# Patient Record
Sex: MALE | Race: WHITE | NOT HISPANIC OR LATINO | Employment: FULL TIME | URBAN - METROPOLITAN AREA
[De-identification: names, ages, dates, MRNs, and addresses within clinical notes are randomized per-mention and may not be internally consistent; named-entity substitution may affect disease eponyms.]

---

## 2020-03-25 ENCOUNTER — OCCUPATIONAL MEDICINE (OUTPATIENT)
Dept: URGENT CARE | Facility: PHYSICIAN GROUP | Age: 35
End: 2020-03-25
Payer: COMMERCIAL

## 2020-03-25 VITALS
BODY MASS INDEX: 23.1 KG/M2 | TEMPERATURE: 98.2 F | OXYGEN SATURATION: 94 % | SYSTOLIC BLOOD PRESSURE: 114 MMHG | HEART RATE: 64 BPM | DIASTOLIC BLOOD PRESSURE: 72 MMHG | WEIGHT: 165 LBS | HEIGHT: 71 IN

## 2020-03-25 DIAGNOSIS — S93.402A MILD ANKLE SPRAIN, LEFT, INITIAL ENCOUNTER: ICD-10-CM

## 2020-03-25 PROCEDURE — 99203 OFFICE O/P NEW LOW 30 MIN: CPT | Performed by: PHYSICIAN ASSISTANT

## 2020-03-25 RX ORDER — SERTRALINE HYDROCHLORIDE 100 MG/1
TABLET, FILM COATED ORAL DAILY
COMMUNITY
Start: 2020-03-09 | End: 2021-12-28

## 2020-03-25 ASSESSMENT — ENCOUNTER SYMPTOMS
TINGLING: 0
SENSORY CHANGE: 0
FOCAL WEAKNESS: 0

## 2020-03-25 ASSESSMENT — PAIN SCALES - GENERAL: PAINLEVEL: 6=MODERATE PAIN

## 2020-03-25 NOTE — PROGRESS NOTES
"Subjective:     Malick Paul  is a 34 y.o. male who presents for Ankle Injury (left ankle inj, stepped in hole, twisted ankle DOI 3/25/2020)    DOI: 3/25/20 at approximately of 11:30 AM.  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  On the date of injury, patient made a delivery and when he turned around his left ankle fell into a hole that was covered with snow sustaining an inversion type injury to the left ankle.  Patient denies any prior injury to the ankle.  Patient is complaining of pain along the lateral aspect of the ankle worse when stepping down and plantar and dorsiflexing the foot.  Denies numbness or tingling.  Denies any pain over the proximal fibula.  Ankle Injury    Pertinent negatives include no tingling.         Review of Systems   Musculoskeletal: Positive for joint pain.   Neurological: Negative for tingling, sensory change and focal weakness.   All other systems reviewed and are negative.    No Known Allergies  Past medical history, family history, surgical history and social history are reviewed and updated in the record today.     Objective:   /72   Pulse 64   Temp 36.8 °C (98.2 °F)   Ht 1.803 m (5' 11\")   Wt 74.8 kg (165 lb)   SpO2 94%   BMI 23.01 kg/m²   Physical Exam  Vitals signs reviewed.   Constitutional:       General: He is not in acute distress.     Appearance: He is well-developed. He is not ill-appearing or toxic-appearing.   HENT:      Head: Normocephalic and atraumatic.      Jaw: There is normal jaw occlusion.      Right Ear: External ear normal.      Left Ear: External ear normal.      Nose: Nose normal.      Mouth/Throat:      Mouth: Mucous membranes are moist.   Eyes:      General: Lids are normal.      Extraocular Movements: Extraocular movements intact.      Conjunctiva/sclera: Conjunctivae normal.      Pupils: Pupils are equal, round, and reactive to light.   Neck:      Musculoskeletal: Normal range of motion and neck " supple.   Cardiovascular:      Rate and Rhythm: Normal rate and regular rhythm.      Pulses:           Dorsalis pedis pulses are 2+ on the right side and 2+ on the left side.        Posterior tibial pulses are 2+ on the right side and 2+ on the left side.      Heart sounds: Normal heart sounds.   Pulmonary:      Effort: Pulmonary effort is normal. No respiratory distress.      Breath sounds: Normal breath sounds.   Musculoskeletal: Normal range of motion.      Right ankle: Normal.      Left ankle: He exhibits normal range of motion, no swelling, no ecchymosis and no deformity. Tenderness. No lateral malleolus and no medial malleolus tenderness found. Achilles tendon normal.        Feet:    Skin:     General: Skin is warm and dry.      Findings: No rash.   Neurological:      Mental Status: He is alert and oriented to person, place, and time.      Cranial Nerves: No cranial nerve deficit.      Sensory: No sensory deficit.      Motor: Motor function is intact.      Coordination: Coordination is intact.   Psychiatric:         Attention and Perception: Attention normal.         Mood and Affect: Mood and affect normal.         Speech: Speech normal.         Behavior: Behavior normal.         Thought Content: Thought content normal.         Cognition and Memory: Cognition normal.         Judgment: Judgment normal.       Left leg: No soft tissue swelling, ecchymosis or deformity noted.  Nontender along proximal fibula.  Left ankle: No soft tissue swelling, ecchymosis or deformity noted.  Dorsalis pedis posterior tibial pulse 2+.  Sensation grossly intact.  Slight tenderness to palpation of her anterior talofibular ligament.  No bony tenderness.  Negative anterior drawer.  No pain with resisted dorsiflexion, plantar flexion, internal or external rotation.  Assessment/Plan:   1. Mild ankle sprain, left, initial encounter    Patient does not meet [place] rules for imaging.  Recommend lace up ankle brace, ice, elevation.   Patient may use over-the-counter ibuprofen as needed for pain not to exceed recommended daily dose.  Work restrictions as noted above.  Follow-up for reevaluation on 3/30/20.    [place] D-39 and C-4 completed.     Differential diagnosis, natural history, supportive care, and indications for immediate follow-up discussed.  Red flag warning symptoms and strict ER/follow-up precautions given.  The patient demonstrated a good understanding and agreed with the treatment plan.  Please note that this note was created using voice recognition speech to text software. Every effort has been made to correct obvious errors.  However, I expect there are errors of grammar and possibly context that were not discovered prior to finalizing the note  FABIANO Vences PA-C

## 2020-03-25 NOTE — LETTER
"EMPLOYEE’S CLAIM FOR COMPENSATION/ REPORT OF INITIAL TREATMENT  FORM C-4    EMPLOYEE’S CLAIM - PROVIDE ALL INFORMATION REQUESTED   First Name  Malick Last Name  Beverly Birthdate                    1985                Sex  male Claim Number   Home Address  [id number] Age  34 y.o. Height  1.803 m (5' 11\") Weight  74.8 kg (165 lb) [facility]     [facility] [facility] [place] Zip  [address] Telephone  [phone] (home)    Mailing Address  [address] [facility] [place] Zip  [address] Primary Language Spoken  English    Insurer  [employer] Third Party   [employer]   Employee's Occupation (Job Title) When Injury or Occupational Disease Occurred      Employer's Name    [employer] Telephone   [phone]   Employer Address   [facility] [address] [facility] [address]   [id number]   Date of Injury  3/25/2020               Hour of Injury  11:00 AM Date Employer Notified  3/25/2020 Last Day of Work after Injury or Occupational Disease  3/25/2020 Supervisor to Whom Injury Reported  Vic   Address or Location of Accident (if applicable)  [[place]]   What were you doing at the time of accident? 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 (Be specific an answer in detail. Use additional sheet if necessary)  stepped in a snow covered hole and rolled my ankle   If you believe that you have an occupational disease, when did you first have knowledge of the disability and it relationship to your employment?  n/a Witnesses to the Accident  none      Nature of Injury or Occupational Disease  Sprain  Part(s) of Body Injured or Affected  Ankle (L), Ankle (L), N/A    I certify that the above is true and correct to the best of my knowledge and that I have provided this information in order to obtain the benefits of [facility] Industrial Insurance and " Occup[place] of [place]).  I hereby authorize any physician, chiropractor, surgeon, practitioner, or other person, any hospital, including [facility] or [facility], any medical service organization, any insurance company, or other institution or organization to release to each other, any medical or other information, including benefits paid or payable, pertinent to this injury or disease, except information relative to diagnosis, treatment and/or counseling for AIDS, psychological conditions, alcohol or controlled substances, for which I must give specific authorization.  A Photostat of this authorization shall be as valid as the original.     Date   Place   Employee’s Signature   THIS REPORT MUST BE COMPLETED AND MAILED WITHIN 3 WORKING DAYS OF TREATMENT   Place  [facility] [place] [facility]  Name of Facility  [facility]   Date  3/25/2020 Diagnosis  (S93.402A) Mild ankle sprain, left, initial encounter Is there evidence the injured employee was under the influence of alcohol and/or another controlled substance at the time of accident?   Hour  3:12 PM Description of Injury or Disease  The encounter diagnosis was Mild ankle sprain, left, initial encounter. No   Treatment  Patient does not meet [place] rules for imaging.  Recommend lace up ankle brace, ice, elevation.  Patient may use over-the-counter ibuprofen as needed for pain not to exceed recommended daily dose.  Work restrictions as noted on [place] D-39.  Follow-up for reevaluation on 3/30/20.  Have you advised the patient to remain off work five days or more? No   X-Ray Findings      If Yes   From Date  To Date      From information given by the employee, together with medical evidence, can you directly connect this injury or occupational disease as job incurred?  Yes If No Full Duty No Modified Duty  Yes   Is additional medical care by a physician indicated?  Yes If Modified Duty, Specify any  "Limitations / Restrictions  Please see [place] D-39 for work restrictions   Do you know of any previous injury or disease contributing to this condition or occupational disease?                            No   Date  3/25/2020 Print Doctor’s Name Dionne Vences P.A.-C. I certify the employer’s copy of  this form was mailed on:   Address  [address] Insurer’s Use Only     [place] Zip  [address]    Provider’s Tax ID Number  [id number] Telephone  Dept: [phone]            e-Signature: Dr. Quan Miranda,   Medical Director Degree  MD        ORIGINAL-TREATING PHYSICIAN OR CHIROPRACTOR    PAGE 2-INSURER/TPA    PAGE 3-EMPLOYER    PAGE 4-EMPLOYEE             Form C-4 (rev.10/07)              BRIEF DESCRIPTION OF RIGHTS AND BENEFITS  (Pursuant to [place]RS 616C.050)    Notice of Injury or Occupational Disease (Incident Report Form C-1): If an injury or occupational disease (OD) arises out of and in the course of employment, you must provide written notice to your employer as soon as practicable, but no later than 7 days after the accident or OD. Your employer shall maintain a sufficient supply of the required forms.    Claim for Compensation (Form C-4): If medical treatment is sought, the form C-4 is available at the place of initial treatment. A completed \"Claim for Compensation\" (Form C-4) must be filed within 90 days after an accident or OD. The treating physician or chiropractor must, within 3 working days after treatment, complete and mail to the employer, the employer's insurer and third-party , the Claim for Compensation.    Medical Treatment: If you require medical treatment for your on-the-job injury or OD, you may be required to select a physician or chiropractor from a list provided by your workers’ compensation insurer, if it has contracted with an Organization for Managed Care (MCO) or Preferred Provider Organization (PPO) or providers of health care. If your employer has not " entered into a contract with an MCO or PPO, you may select a physician or chiropractor from the Panel of Physicians and Chiropractors. Any medical costs related to your industrial injury or OD will be paid by your insurer.    Temporary Total Disability (TTD): If your doctor has certified that you are unable to work for a period of at least 5 consecutive days, or 5 cumulative days in a 20-day period, or places restrictions on you that your employer does not accommodate, you may be entitled to TTD compensation.    Temporary Partial Disability (TPD): If the wage you receive upon reemployment is less than the compensation for TTD to which you are entitled, the insurer may be required to pay you TPD compensation to make up the difference. TPD can only be paid for a maximum of 24 months.    Permanent Partial Disability (PPD): When your medical condition is stable and there is an indication of a PPD as a result of your injury or OD, within 30 days, your insurer must arrange for an evaluation by a rating physician or chiropractor to determine the degree of your PPD. The amount of your PPD award depends on the date of injury, the results of the PPD evaluation and your age and wage.    Permanent Total Disability (PTD): If you are medically certified by a treating physician or chiropractor as permanently and totally disabled and have been granted a PTD status by your insurer, you are entitled to receive monthly benefits not to exceed 66 2/3% of your average monthly wage. The amount of your PTD payments is subject to reduction if you previously received a PPD award.    Vocational Rehabilitation Services: You may be eligible for vocational rehabilitation services if you are unable to return to the job due to a permanent physical impairment or permanent restrictions as a result of your injury or occupational disease.    Transportation and Per Maranda Reimbursement: You may be eligible for travel expenses and per maranda associated with  medical treatment.    Reopening: You may be able to reopen your claim if your condition worsens after claim closure.    Appeal Process: If you disagree with a written determination issued by the insurer or the insurer does not respond to your request, you may appeal to the Department of Administration, , by following the instructions contained in your determination letter. You must appeal the determination within 70 days from the date of the determination letter at [address], [address], [place], [place] [address], or [address], [place], [place] [address]. If you disagree with the  decision, you may appeal to the Department of Administration, . You must file your appeal within 30 days from the date of the  decision letter at [address], [address], [place], [place] [address], or [address], [place], [place] [address]. If you disagree with a decision of an , you may file a petition for judicial review with the District Court. You must do so within 30 days of the Appeal Officer’s decision. You may be represented by an  at your own expense or you may contact the [facility] for possible representation.    [place]  for Injured Workers (NAIW): If you disagree with a  decision, you may request that NAIW represent you without charge at an  Hearing. For information regarding denial of benefits, you may contact the [facility] at: [address], [address], [place] [address], [phone], or [address], [address], [place] [address], [phone]    To File a Complaint with the Division: If you wish to file a complaint with the  of the Division of Industrial Relations (DIR),  please contact the Workers’ Compensation Section, [address], [address], [place], [place] [address], telephone (629) [phone], or [address], [address], [place], [place] [address], telephone [phone].    For assistance with Workers’ Compensation Issues: You may contact the Office of the Governor Consumer Health Assistance, [address] [place] [address], [address], [place], [place] [address], Toll Free 1-924.308.4824, Web site: http://[employer].[facility].nv., E-mail [email or web address].                   __________________________________________________________________                                                     _________        Employee Name / Signature                                                                                                                                              Date                                                                                                                                                                                                     D-2 (rev. 06/18)

## 2020-03-30 ENCOUNTER — OCCUPATIONAL MEDICINE (OUTPATIENT)
Dept: URGENT CARE | Facility: PHYSICIAN GROUP | Age: 35
End: 2020-03-30
Payer: COMMERCIAL

## 2020-03-30 VITALS
WEIGHT: 165 LBS | BODY MASS INDEX: 23.1 KG/M2 | HEART RATE: 69 BPM | SYSTOLIC BLOOD PRESSURE: 132 MMHG | TEMPERATURE: 97.3 F | OXYGEN SATURATION: 95 % | DIASTOLIC BLOOD PRESSURE: 86 MMHG | HEIGHT: 71 IN | RESPIRATION RATE: 18 BRPM

## 2020-03-30 DIAGNOSIS — S93.409A SPRAIN AND STRAIN OF ANKLE: ICD-10-CM

## 2020-03-30 DIAGNOSIS — S96.919A SPRAIN AND STRAIN OF ANKLE: ICD-10-CM
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
## 2020-03-30 PROCEDURE — 99214 OFFICE O/P EST MOD 30 MIN: CPT | Performed by: NURSE PRACTITIONER

## 2020-03-30 ASSESSMENT — ENCOUNTER SYMPTOMS
FOCAL WEAKNESS: 0
TINGLING: 0
FALLS: 0
SENSORY CHANGE: 0

## 2020-03-30 ASSESSMENT — LIFESTYLE VARIABLES: SUBSTANCE_ABUSE: 0

## 2020-03-30 NOTE — LETTER
[facility]  [address] [place] [address]  Phone:  [phone] - Fax:  [phone]   [facility] Progress Report and Disability Certification  Date of Service: 3/30/2020   No Show:  No  Date / Time of Next Visit: 4/10/2020   Claim Information   Patient Name: Malick Paul  Claim Number:     Employer:    Date of Injury: 3/25/2020     Insurer / TPA: [employer]  ID / SSN:     Occupation:   Diagnosis: Diagnoses of Sprain and strain of ankle and Work related injury were pertinent to this visit.    Medical Information   Related to Industrial Injury? Yes    Subjective Complaints:  DOI 03/25/2020.   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   Pain is unimproved from previous visit. Did not use brace yesterday and he tried to walk but pain came back after about 20 minutes of walking so he reapplied the brace.  Ankle brace helps reduce his pain. No pain at work during the day.  He is currently working at restricted duty at this time. Tolerating well. Not taking any medication for pain. Denies numbness or tingling in feet, ankle. Pain does not wake him up at night. No other aggravating or alleviating factors.   No other employers.      Objective Findings: VSS: Gait is smooth and steady with brace. Full weight bearing without difficulty.  Inspection of Left ankle: normal. No KIESHA or ecchymosis. Palpation + TTP at ATFL. No achilles tendon tenderness. No bony tenderness. Neg talar tilt.    Pre-Existing Condition(s): Denies     Assessment:   Condition Same    Status: Additional Care Required  Permanent Disability:No    Plan: Medication  Comments:OTC analgesic of choice prn pain per  directions. work restrictions, lace up ankle brace.     Diagnostics:      Comments:       Disability Information   Status: Released to Restricted Duty    From:  3/30/2020  Through: 4/10/2020  Restrictions are: Temporary   Physical Restrictions   Sitting:    Standing:  < or = to 4 hrs/day Stooping:    Bending:      Squatting:    Walking:  < or = to 2 hrs/day Climbing:  < or = to 1 hr/day Pushing:      Pulling:    Other:    Reaching Above Shoulder (L):   Reaching Above Shoulder (R):       Reaching Below Shoulder (L):    Reaching Below Shoulder (R):      Not to exceed Weight Limits   Carrying(hrs):   Weight Limit(lb):   Lifting(hrs):   Weight  Limit(lb):     Comments:  Recommend lace up ankle brace, ice, elevation.  Patient may use over-the-counter ibuprofen as needed for pain not to exceed recommended daily dose.  Work restrictions as noted above.  Follow-up for reevaluation     Repetitive Actions   Hands: i.e. Fine Manipulations from Grasping:     Feet: i.e. Operating Foot Controls:   Comments:No use of left foot/ ankle for foot controls.    Driving / Operate Machinery:     Physician Name: Purvi Saab, A.PEnriqueNEnrique Physician Signature:   e-Signature: Dr. Quan Miranda, Medical Director   Clinic Name / Location: [address] [address] Clinic Phone Number: Dept: [phone]   Appointment Time: 5:30 Pm Visit Start Time: 5:39 PM   Check-In Time:  5:26 Pm Visit Discharge Time:  5:55 PM   Original-Treating Physician or Chiropractor    Page 2-Insurer/TPA    Page 3-Employer    Page 4-Employee

## 2020-03-31 NOTE — PROGRESS NOTES
"Subjective:      Malick Paul is a 34 y.o. male who presents with Work-Related Injury (FV/ DOI 3-/ L ankle )    Reviewed past medical, surgical and family history. Reviewed prescription and OTC medications with patient in electronic health record today      No Known Allergies         HPI DOI 03/25/2020.   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   Pain is unimproved from previous visit. Did not use brace yesterday and he tried to walk but pain came back after about 20 minutes of walking so he reapplied the brace.  Ankle brace helps reduce his pain. No pain at work during the day.  He is currently working at restricted duty at this time. Tolerating well. Not taking any medication for pain. Denies numbness or tingling in feet, ankle. Pain does not wake him up at night. No other aggravating or alleviating factors.   No other employers.     Review of Systems   Musculoskeletal: Positive for joint pain. Negative for falls.   Neurological: Negative for tingling, sensory change and focal weakness.   Psychiatric/Behavioral: Negative for substance abuse.          Objective:     /86   Pulse 69   Temp 36.3 °C (97.3 °F) (Temporal)   Resp 18   Ht 1.803 m (5' 11\")   Wt 74.8 kg (165 lb)   SpO2 95%   BMI 23.01 kg/m²      Physical Exam  Vitals signs and nursing note reviewed.   Constitutional:       Appearance: He is well-developed.   Cardiovascular:      Rate and Rhythm: Normal rate and regular rhythm.      Pulses: Normal pulses.   Pulmonary:      Effort: Pulmonary effort is normal. No respiratory distress.   Musculoskeletal:      Left foot: Normal range of motion and normal capillary refill. Tenderness (subjective) present. No bony tenderness, swelling, crepitus or deformity.        Feet:    Skin:     General: Skin is warm and dry.      Capillary Refill: Capillary refill takes less than 2 seconds.   Neurological:      Mental " Status: He is alert.      Cranial Nerves: No cranial nerve deficit.      Coordination: Coordination normal.      Deep Tendon Reflexes: Reflexes are normal and symmetric.   Psychiatric:         Mood and Affect: Mood normal.         Speech: Speech normal.         Behavior: Behavior normal.         Thought Content: Thought content normal.         VSS: Gait is smooth and steady with brace. Full weight bearing without difficulty.  Inspection of Left ankle: normal. No KIESHA or ecchymosis. Palpation + TTP at ATFL. No achilles tendon tenderness. No bony tenderness. Neg talar tilt.      Reviewed previous D 39 and C4 report from initial visit     Assessment/Plan:       1. Sprain and strain of ankle      2. Work related injury    See [place] D39     Work restrictions     OTC  analgesic of choice. Follow manufactures dosing and safety precautions.     Ankle brace    FU 10 days occ. Health

## 2020-04-10 ENCOUNTER — OCCUPATIONAL MEDICINE (OUTPATIENT)
Dept: URGENT CARE | Facility: PHYSICIAN GROUP | Age: 35
End: 2020-04-10
Payer: COMMERCIAL

## 2020-04-10 VITALS
TEMPERATURE: 98.2 F | DIASTOLIC BLOOD PRESSURE: 80 MMHG | HEART RATE: 74 BPM | BODY MASS INDEX: 23.1 KG/M2 | HEIGHT: 71 IN | RESPIRATION RATE: 15 BRPM | WEIGHT: 165 LBS | SYSTOLIC BLOOD PRESSURE: 136 MMHG | OXYGEN SATURATION: 96 %
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
## 2020-04-10 DIAGNOSIS — S96.919A SPRAIN AND STRAIN OF ANKLE: Primary | ICD-10-CM

## 2020-04-10 DIAGNOSIS — S93.409A SPRAIN AND STRAIN OF ANKLE: Primary | ICD-10-CM

## 2020-04-10 PROCEDURE — 99213 OFFICE O/P EST LOW 20 MIN: CPT | Performed by: PHYSICIAN ASSISTANT

## 2020-04-10 ASSESSMENT — ENCOUNTER SYMPTOMS
FOCAL WEAKNESS: 0
TINGLING: 0
SENSORY CHANGE: 0

## 2020-04-10 ASSESSMENT — PAIN SCALES - GENERAL: PAINLEVEL: NO PAIN

## 2020-04-10 NOTE — LETTER
[facility]  [address]  [place] [address]  Phone:  [phone] - Fax:  [phone]   [facility] Progress Report and Disability Certification  Date of Service: 4/10/2020   No Show:  No  Date / Time of Next Visit:     Claim Information   Patient Name: Malick Paul  Claim Number:     Employer:   [employer] Date of Injury: 3/25/2020     Insurer / TPA: [employer]  ID / SSN:     Occupation:   Diagnosis: Diagnoses of Sprain and strain of ankle, left and Work related injury were pertinent to this visit.    Medical Information   Related to Industrial Injury? Yes    Subjective Complaints:  DOI: 3/25/2020.  PT here for follow up for ankle sprain that occurred while at work.  Pt states ankle feels back to normal and feels he is ready to be released back to work MMI. No pain or swelling to ankle. Pt ambulates normally. No longer taking otc medications for pain.  0/10 pain.    Objective Findings: Left ankle: FROM of ankle, no swelling or ecchymosis noted.  Pt ambulates with normal gait, can step up, down and squat without difficulty or pain.    Pre-Existing Condition(s):     Assessment:   Condition Improved    Status: Discharged /  MMI  Permanent Disability:No    Plan:      Diagnostics:      Comments:       Disability Information   Status:      From:     Through:   Restrictions are:     Physical Restrictions   Sitting:    Standing:    Stooping:    Bending:      Squatting:    Walking:    Climbing:    Pushing:      Pulling:    Other:    Reaching Above Shoulder (L):   Reaching Above Shoulder (R):       Reaching Below Shoulder (L):    Reaching Below Shoulder (R):      Not to exceed Weight Limits   Carrying(hrs):   Weight Limit(lb):   Lifting(hrs):   Weight  Limit(lb):     Comments: Returned to work without restrictions. No longer requires assistance of helper while at work.  Discharged MMI.      Repetitive Actions   Hands: i.e. Fine Manipulations from  Grasping:     Feet: i.e. Operating Foot Controls:     Driving / Operate Machinery:     Physician Name: Perlita Dubon P.A.-C. Physician Signature: PERLITA Anthony P.A.-C. e-Signature: Dr. Quan Miranda, Medical Director   Clinic Name / Location: [address] [address] Clinic Phone Number: Dept: [phone]   Appointment Time: 5:00 Pm Visit Start Time: 4:21 PM   Check-In Time:  4:19 Pm Visit Discharge Time:  4:59pm   Original-Treating Physician or Chiropractor    Page 2-Insurer/TPA    Page 3-Employer    Page 4-Employee

## 2020-04-11 NOTE — PROGRESS NOTES
"Subjective:      Malick Paul is a 34 y.o. male who presents with Follow-Up      DOI: 3/25/2020.  PT here for follow up for ankle sprain that occurred while at work.  Pt states ankle feels back to normal and feels he is ready to be released back to work MMI. No pain or swelling to ankle. Pt ambulates normally. No longer taking otc medications for pain.  0/10 pain.      Patient presents with: Work comp injury, Follow-Up appointment.  Patient states he is ready to return to work without any restrictions, feels like his ankle is totally back to normal.          Review of Systems   Musculoskeletal: Negative for joint pain.   Neurological: Negative for tingling, sensory change and focal weakness.          Objective:     /80   Pulse 74   Temp 36.8 °C (98.2 °F)   Resp 15   Ht 1.803 m (5' 11\")   Wt 74.8 kg (165 lb)   SpO2 96%   BMI 23.01 kg/m²      Physical Exam    Left ankle: FROM of ankle, no swelling or ecchymosis noted.  Pt ambulates with normal gait, can step up, down and squat without difficulty or pain.        Assessment/Plan:     1. Sprain and strain of ankle, left     2. Work related injury       PT has reached MMI and has been discharged to return to work without restrictions.     "

## 2021-08-10 NOTE — LETTER
[place] [address] [facility] [place] [address]  Phone:  [phone] - Fax:  [phone]   [facility] Progress Report and Disability Certification  Date of Service: 3/25/2020   No Show:  No  Date / Time of Next Visit: 3/30/2020   Claim Information   Patient Name: Malick Paul  Claim Number:     Employer:   [employer] Date of Injury: 3/25/2020     Insurer / TPA: [employer]  ID / SSN:     Occupation:   Diagnosis: The encounter diagnosis was Mild ankle sprain, left, initial encounter.    Medical Information   Related to Industrial Injury? Yes    Subjective Complaints:  DOI: 3/25/20 at approximately of 11:30 AM.  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  On the date of injury, patient made a delivery and when he turned around his left ankle fell into a hole that was covered with snow sustaining an inversion type injury to the left ankle.  Patient denies any prior injury to the ankle.  Patient is complaining of pain along the lateral aspect of the ankle worse when stepping down and plantar and dorsiflexing the foot.  Denies numbness or tingling.  Denies any pain over the proximal fibula.   Objective Findings: Left leg: No soft tissue swelling, ecchymosis or deformity noted.  Nontender along proximal fibula.  Left ankle: No soft tissue swelling, ecchymosis or deformity noted.  Dorsalis pedis posterior tibial pulse 2+.  Sensation grossly intact.  Slight tenderness to palpation of her anterior talofibular ligament.  No bony tenderness.  Negative anterior drawer.  No pain with resisted dorsiflexion, plantar flexion, internal or external rotation.   Pre-Existing Condition(s):     Assessment:   Initial Visit    Status: Additional Care Required  Permanent Disability:No    Plan:      Diagnostics:      Comments:       Disability Information   Status: Released to Restricted Duty    From:  3/25/2020  Through: 3/30/2020  Restrictions are: Temporary   Physical Restrictions   Sitting:    Standing:  < or = to 4 hrs/day Stooping:    Bending:      Squatting:    Walking:  < or = to 2 hrs/day Climbing:  < or = to 1 hr/day Pushing:      Pulling:    Other:    Reaching Above Shoulder (L):   Reaching Above Shoulder (R):       Reaching Below Shoulder (L):    Reaching Below Shoulder (R):      Not to exceed Weight Limits   Carrying(hrs):   Weight Limit(lb): < or = to 10 pounds Lifting(hrs):   Weight  Limit(lb): < or = to 10 pounds   Comments: Patient does not meet [place] rules for imaging.  Recommend lace up ankle brace, ice, elevation.  Patient may use over-the-counter ibuprofen as needed for pain not to exceed recommended daily dose.  Work restrictions as noted above.  Follow-up for reevaluation on 3/30/20.    Repetitive Actions   Hands: i.e. Fine Manipulations from Grasping:     Feet: i.e. Operating Foot Controls:     Driving / Operate Machinery:     Physician Name: Dionne Vences P.A.-C. Physician Signature:   e-Signature: Dr. Quan Miranda, Medical Director   Clinic Name / Location: [address] [address] Clinic Phone Number: Dept: [phone]   Appointment Time: 2:30 Pm Visit Start Time: 3:12 PM   Check-In Time:  2:46 Pm Visit Discharge Time:  3:47 PM   Original-Treating Physician or Chiropractor    Page 2-Insurer/TPA    Page 3-Employer    Page 4-Employee     Detail Level: Zone Initiate Treatment: imiquimod 5 % topical cream packet \\nQuantity: 2.0 Packet  Days Supply: 30\\nSig: Apply to L temple once daily Monday through Friday x 3-4 weeks

## 2021-12-28 ENCOUNTER — OFFICE VISIT (OUTPATIENT)
Dept: URGENT CARE | Facility: PHYSICIAN GROUP | Age: 36
End: 2021-12-28
Payer: OTHER MISCELLANEOUS

## 2021-12-28 VITALS
BODY MASS INDEX: 23.03 KG/M2 | SYSTOLIC BLOOD PRESSURE: 120 MMHG | DIASTOLIC BLOOD PRESSURE: 78 MMHG | WEIGHT: 170 LBS | OXYGEN SATURATION: 97 % | HEART RATE: 80 BPM | TEMPERATURE: 98.8 F | HEIGHT: 72 IN

## 2021-12-28 DIAGNOSIS — R07.9 CHEST PAIN, UNSPECIFIED TYPE: ICD-10-CM

## 2021-12-28 LAB
EXTERNAL QUALITY CONTROL: NORMAL
SARS-COV+SARS-COV-2 AG RESP QL IA.RAPID: NEGATIVE

## 2021-12-28 PROCEDURE — 99213 OFFICE O/P EST LOW 20 MIN: CPT | Performed by: FAMILY MEDICINE

## 2021-12-28 PROCEDURE — 87426 SARSCOV CORONAVIRUS AG IA: CPT | Performed by: FAMILY MEDICINE

## 2021-12-28 NOTE — PROGRESS NOTES
Subjective:      36 y.o. male presents to urgent care for chest pain that started this morning while he was driving to work. The pain is located in his in his right chest, it is intermittent, coming with certain movement and improves with rest, it's described as squeezing, currently rated 6/10. He has not yet taken anything for the pain. He did have some tingling to his right fingers but this has since resolved. No associated diaphoresis, shortness of breath, or vomiting. He did have a similar episode last February, he went to [facility] ED at that time and was apparently diagnosed with an anxiety attack.  He denies any tobacco product use.  No family history of early cardiac death.    He denies any other questions or concerns at this time.    Current problem list, medication, and past medical/surgical history were reviewed in Epic.    ROS  See HPI     Objective:      /78 (BP Location: Left arm, Patient Position: Sitting, BP Cuff Size: Adult)   Pulse 80   Temp 37.1 °C (98.8 °F) (Temporal)   Ht 1.829 m (6')   Wt 77.1 kg (170 lb)   SpO2 97%   BMI 23.06 kg/m²     Physical Exam  Constitutional:       General: He is not in acute distress.     Appearance: He is not diaphoretic.   Cardiovascular:      Rate and Rhythm: Normal rate and regular rhythm.      Heart sounds: Normal heart sounds.   Pulmonary:      Effort: Pulmonary effort is normal. No respiratory distress.      Breath sounds: Normal breath sounds.   Neurological:      Mental Status: He is alert.   Psychiatric:         Mood and Affect: Affect normal.         Judgment: Judgment normal.       Assessment/Plan:     1. Chest pain, unspecified type  Most likely related to a muscular strain or sprain.  He states his daughter slept with him last night, leaving him to sleep in an awkward position.  The chest pain only comes with certain neck movements, it is right-sided.  I think it is low likelihood that it is related to cardiac etiology.  EKG showing a  heart rate of 64 bpm, regular rate and rhythm, no ST changes.  Negative rapid Covid in urgent care  - EKG - Clinic Performed  - POCT SARS-COV Antigen JENAE (Symptomatic Only)      Instructed to return to Urgent Care or nearest Emergency Department if symptoms fail to improve, for any change in condition, further concerns, or new concerning symptoms. Patient states understanding of the plan of care and discharge instructions.    Margaux Santamaria M.D.

## 2022-01-19 ENCOUNTER — TELEPHONE (OUTPATIENT)
Dept: SCHEDULING | Facility: IMAGING CENTER | Age: 37
End: 2022-01-19

## 2022-01-31 ENCOUNTER — OFFICE VISIT (OUTPATIENT)
Dept: MEDICAL GROUP | Facility: PHYSICIAN GROUP | Age: 37
End: 2022-01-31
Payer: OTHER MISCELLANEOUS

## 2022-01-31 ENCOUNTER — HOSPITAL ENCOUNTER (OUTPATIENT)
Dept: LAB | Facility: MEDICAL CENTER | Age: 37
End: 2022-01-31
Payer: OTHER MISCELLANEOUS

## 2022-01-31 VITALS
DIASTOLIC BLOOD PRESSURE: 76 MMHG | HEART RATE: 60 BPM | RESPIRATION RATE: 18 BRPM | TEMPERATURE: 98.3 F | BODY MASS INDEX: 22.48 KG/M2 | SYSTOLIC BLOOD PRESSURE: 114 MMHG | HEIGHT: 72 IN | WEIGHT: 166 LBS | OXYGEN SATURATION: 97 %

## 2022-01-31 DIAGNOSIS — Z00.00 ENCOUNTER FOR MEDICAL EXAMINATION TO ESTABLISH CARE: ICD-10-CM

## 2022-01-31 DIAGNOSIS — K64.9 HEMORRHOIDS, UNSPECIFIED HEMORRHOID TYPE: ICD-10-CM

## 2022-01-31 DIAGNOSIS — F41.8 ANXIETY ABOUT DYING: ICD-10-CM

## 2022-01-31 DIAGNOSIS — Z23 NEED FOR VACCINATION: ICD-10-CM

## 2022-01-31 PROBLEM — F41.9 ANXIETY: Status: ACTIVE | Noted: 2022-01-31

## 2022-01-31 PROBLEM — K92.1 BLOOD IN STOOL: Status: ACTIVE | Noted: 2022-01-31

## 2022-01-31 LAB
25(OH)D3 SERPL-MCNC: 22 NG/ML (ref 30–100)
ALBUMIN SERPL BCP-MCNC: 4.7 G/DL (ref 3.2–4.9)
ALBUMIN/GLOB SERPL: 1.7 G/DL
ALP SERPL-CCNC: 78 U/L (ref 30–99)
ALT SERPL-CCNC: 19 U/L (ref 2–50)
ANION GAP SERPL CALC-SCNC: 11 MMOL/L (ref 7–16)
AST SERPL-CCNC: 19 U/L (ref 12–45)
BILIRUB SERPL-MCNC: 0.4 MG/DL (ref 0.1–1.5)
BUN SERPL-MCNC: 11 MG/DL (ref 8–22)
CALCIUM SERPL-MCNC: 9.4 MG/DL (ref 8.5–10.5)
CHLORIDE SERPL-SCNC: 103 MMOL/L (ref 96–112)
CHOLEST SERPL-MCNC: 167 MG/DL (ref 100–199)
CO2 SERPL-SCNC: 25 MMOL/L (ref 20–33)
CREAT SERPL-MCNC: 0.87 MG/DL (ref 0.5–1.4)
ERYTHROCYTE [DISTWIDTH] IN BLOOD BY AUTOMATED COUNT: 44.6 FL (ref 35.9–50)
FASTING STATUS PATIENT QL REPORTED: NORMAL
GLOBULIN SER CALC-MCNC: 2.8 G/DL (ref 1.9–3.5)
GLUCOSE SERPL-MCNC: 85 MG/DL (ref 65–99)
HCT VFR BLD AUTO: 45.9 % (ref 42–52)
HDLC SERPL-MCNC: 49 MG/DL
HGB BLD-MCNC: 15.3 G/DL (ref 14–18)
LDLC SERPL CALC-MCNC: 105 MG/DL
MCH RBC QN AUTO: 30.8 PG (ref 27–33)
MCHC RBC AUTO-ENTMCNC: 33.3 G/DL (ref 33.7–35.3)
MCV RBC AUTO: 92.5 FL (ref 81.4–97.8)
PLATELET # BLD AUTO: 209 K/UL (ref 164–446)
PMV BLD AUTO: 11.8 FL (ref 9–12.9)
POTASSIUM SERPL-SCNC: 4.4 MMOL/L (ref 3.6–5.5)
PROT SERPL-MCNC: 7.5 G/DL (ref 6–8.2)
RBC # BLD AUTO: 4.96 M/UL (ref 4.7–6.1)
SODIUM SERPL-SCNC: 139 MMOL/L (ref 135–145)
TRIGL SERPL-MCNC: 64 MG/DL (ref 0–149)
TSH SERPL DL<=0.005 MIU/L-ACNC: 1.72 UIU/ML (ref 0.38–5.33)
WBC # BLD AUTO: 4 K/UL (ref 4.8–10.8)

## 2022-01-31 PROCEDURE — 90471 IMMUNIZATION ADMIN: CPT

## 2022-01-31 PROCEDURE — 90686 IIV4 VACC NO PRSV 0.5 ML IM: CPT

## 2022-01-31 PROCEDURE — 99214 OFFICE O/P EST MOD 30 MIN: CPT | Mod: 25

## 2022-01-31 PROCEDURE — 82306 VITAMIN D 25 HYDROXY: CPT

## 2022-01-31 PROCEDURE — 90715 TDAP VACCINE 7 YRS/> IM: CPT

## 2022-01-31 PROCEDURE — 85027 COMPLETE CBC AUTOMATED: CPT

## 2022-01-31 PROCEDURE — 36415 COLL VENOUS BLD VENIPUNCTURE: CPT

## 2022-01-31 PROCEDURE — 80061 LIPID PANEL: CPT

## 2022-01-31 PROCEDURE — 90472 IMMUNIZATION ADMIN EACH ADD: CPT

## 2022-01-31 PROCEDURE — 80053 COMPREHEN METABOLIC PANEL: CPT

## 2022-01-31 PROCEDURE — 84443 ASSAY THYROID STIM HORMONE: CPT

## 2022-01-31 RX ORDER — BUSPIRONE HYDROCHLORIDE 10 MG/1
10 TABLET ORAL 2 TIMES DAILY
Qty: 120 TABLET | Refills: 0 | Status: SHIPPED | OUTPATIENT
Start: 2022-01-31 | End: 2022-04-01

## 2022-01-31 ASSESSMENT — PATIENT HEALTH QUESTIONNAIRE - PHQ9: CLINICAL INTERPRETATION OF PHQ2 SCORE: 0

## 2022-01-31 NOTE — PROGRESS NOTES
"CC:   Chief Complaint   Patient presents with   • Establish Care   • Anxiety   • Other     blood in stool, family hx of heart attack        HISTORY OF PRESENT ILLNESS: Patient is a 36 y.o. male established patient who presents today to discuss the following problems below:     Anxiety  Patient reports that over the last 4 months he has had horrible panic attacks that caused him to think about time for 2 to 3 days straight.  He does not have any history of sudden death in his family aside from his brother who sadly committed suicide.  He reports that his father had a heart attack at age 32, and has had a subsequent heart attack since then.  He has survived both, but this is caused the patient to be concerned about the possibility that he may have a heart attack himself.  He notes that he has lost about 10 to 20 pounds over the last 4 months, coinciding with the onset of his anxiety.  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  He reports that he went to therapist at one point who felt that he was depressed, and put him on Wellbutrin.  He did not like this medication made him feel \"high\"    Blood in stool  Patient reports that a couple weeks ago he had a bout of severe constipation that resulted in a lot of vasovagal maneuvers.  At that time he felt that he possibly tore the tissue around his anus, but has since noticed a bulge near his rectum with persistent bright red bleeding after he has a bowel movement.  He has purchased stool softeners and has not had any further issues with constipation      No past medical history on file.    Allergies:Patient has no known allergies.    Review of Systems: Otherwise negative except for as stated above.      Exam: /76 (BP Location: Right arm, Patient Position: Sitting, BP Cuff Size: Adult)   Pulse 60   Temp 36.8 °C (98.3 °F) (Temporal)   Resp 18   Ht 1.829 m (6')   Wt 75.3 kg (166 lb)   SpO2 97%  " Body mass index is 22.51 kg/m².    Gen: Alert and oriented x4. Well developed, well-nourished male in no apparent distress.  Skin: Warm, dry, good turgor, no rashes in visible areas or lacerations appreciated.   Eye: EOM intact, pupils equal, round and reactive, conjunctiva clear, lids normal.  Neck: Trachea midline, no masses, no thyromegaly  Lungs: Normal effort, CTA bilaterally, no wheezes, rhonchi, or rales. No stridor or audible wheezing. Equal chest expansion.   CV: Regular rate and rhythm. No murmurs, rubs, or gallops.  GI:  Soft, non-tender abdomen with no distention.   MSK: Normal gait, moves all extremities.  Neuro: Alert and oriented x 4, non-focal exam with motor and sensory grossly intact.  Ext: No clubbing, cyanosis, edema.  Psych: Normal behavior, affect and mood.      Assessment/Plan:  36 y.o. male with the following -    1. Anxiety about dying  Acute condition.  Patient currently reports that he does not have the time to do therapy due to his schedule.  He is interested in a noncontrolled substance for management of his anxiety.  Agree.  Trial of buspirone 10 mg twice daily as needed  - busPIRone (BUSPAR) 10 MG Tab tablet; Take 1 Tablet by mouth 2 times a day for 60 days.  Dispense: 120 Tablet; Refill: 0    2. Hemorrhoids, unspecified hemorrhoid type  Acute condition.  Discussed with patient that he most likely has a hemorrhoid.  Continue stool softeners.  Recommend trial of topical numbing and steroid cream.  If persistent, consider referral to GI for banding  - Lidocaine-Hydrocortisone Ace 1-1 % Cream; Apply 1 Application topically 2 times a day for 7 days.  Dispense: 30 g; Refill: 0    3. Encounter for medical examination to establish care  Baseline labs ordered today.  Due to history of father having heart attack at age 32, I will obtain a lipid panel.  - Comp Metabolic Panel; Future  - Lipid Profile; Future  - TSH WITH REFLEX TO FT4; Future  - VITAMIN D,25 HYDROXY; Future  - CBC WITHOUT  DIFFERENTIAL; Future    4. Need for vaccination  - INFLUENZA VACCINE QUAD INJ (PF)  - TDAP VACCINE =>6YO IM      Follow-up: Return in about 4 weeks (around 2/28/2022) for review anxiety .    Health Maintenance: Completed      Please note that this dictation was created using voice recognition software. I have made every reasonable attempt to correct obvious errors, but I expect that there are errors of grammar and possibly content that I did not discover before finalizing the note.    Electronically signed by RADHA Anderson on January 31, 2022

## 2022-02-01 NOTE — ASSESSMENT & PLAN NOTE
"Patient reports that over the last 4 months he has had horrible panic attacks that caused him to think about time for 2 to 3 days straight.  He does not have any history of sudden death in his family aside from his brother who sadly committed suicide.  He reports that his father had a heart attack at age 32, and has had a subsequent heart attack since then.  He has survived both, but this is caused the patient to be concerned about the possibility that he may have a heart attack himself.  He notes that he has lost about 10 to 20 pounds over the last 4 months, coinciding with the onset of his anxiety.  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  He reports that he went to therapist at one point who felt that he was depressed, and put him on Wellbutrin.  He did not like this medication made him feel \"high\"  "

## 2022-02-01 NOTE — ASSESSMENT & PLAN NOTE
Patient reports that a couple weeks ago he had a bout of severe constipation that resulted in a lot of vasovagal maneuvers.  At that time he felt that he possibly tore the tissue around his anus, but has since noticed a bulge near his rectum with persistent bright red bleeding after he has a bowel movement.  He has purchased stool softeners and has not had any further issues with constipation

## 2022-02-08 DIAGNOSIS — K64.9 HEMORRHOIDS, UNSPECIFIED HEMORRHOID TYPE: ICD-10-CM

## 2022-02-08 RX ORDER — MINERAL OIL 140; 749; 2.5 MG/100G; MG/100G; MG/100G
1 OINTMENT RECTAL; TOPICAL
Qty: 56 G | Refills: 0 | Status: SHIPPED | OUTPATIENT
Start: 2022-02-08 | End: 2022-02-19